# Patient Record
Sex: MALE | Race: BLACK OR AFRICAN AMERICAN | NOT HISPANIC OR LATINO | Employment: UNEMPLOYED | URBAN - METROPOLITAN AREA
[De-identification: names, ages, dates, MRNs, and addresses within clinical notes are randomized per-mention and may not be internally consistent; named-entity substitution may affect disease eponyms.]

---

## 2018-05-06 ENCOUNTER — HOSPITAL ENCOUNTER (EMERGENCY)
Facility: HOSPITAL | Age: 33
Discharge: HOME | End: 2018-05-06
Attending: EMERGENCY MEDICINE

## 2018-05-06 VITALS
DIASTOLIC BLOOD PRESSURE: 81 MMHG | WEIGHT: 220 LBS | OXYGEN SATURATION: 99 % | TEMPERATURE: 98 F | RESPIRATION RATE: 18 BRPM | SYSTOLIC BLOOD PRESSURE: 118 MMHG | HEART RATE: 69 BPM | HEIGHT: 72 IN | BODY MASS INDEX: 29.8 KG/M2

## 2018-05-06 DIAGNOSIS — S69.92XA FISH HOOK INJURY OF FINGER OF LEFT HAND, INITIAL ENCOUNTER: Primary | ICD-10-CM

## 2018-05-06 PROCEDURE — 99283 EMERGENCY DEPT VISIT LOW MDM: CPT | Mod: 25

## 2018-05-06 PROCEDURE — 25000003 PHARM REV CODE 250: Performed by: NURSE PRACTITIONER

## 2018-05-06 PROCEDURE — 10120 INC&RMVL FB SUBQ TISS SMPL: CPT

## 2018-05-06 RX ORDER — LIDOCAINE HYDROCHLORIDE 10 MG/ML
10 INJECTION INFILTRATION; PERINEURAL
Status: COMPLETED | OUTPATIENT
Start: 2018-05-06 | End: 2018-05-06

## 2018-05-06 RX ADMIN — BACITRACIN, NEOMYCIN, POLYMYXIN B 1 EACH: 400; 3.5; 5 OINTMENT TOPICAL at 01:05

## 2018-05-06 RX ADMIN — LIDOCAINE HYDROCHLORIDE 10 ML: 10 INJECTION, SOLUTION INFILTRATION; PERINEURAL at 01:05

## 2018-05-06 NOTE — ED TRIAGE NOTES
Pt. Reports being digging in box and having fish hook lodged in left index finger. No drainage at the site pt. Rates pain 8/10 in NAD

## 2018-05-06 NOTE — ED PROVIDER NOTES
"Encounter Date: 5/6/2018  SORT: This is a 33 y/o male that presents to ED with a fish hook embedded into his left index finger.  The fish hook is pronged and he cannot remove it.  His tetanus is UTD.  Orders placed. Awaiting further evaluation.     History     Chief Complaint   Patient presents with    Foreign Body     " got a fish hook caught in my left index finger"      CC: fish hook injury    HPI:  32-year-old male presents for emergent consideration of a fishhook stuck in his left index finger just prior to arrival.  He has not tried any techniques to remove it.  Reports his tetanus shot is up-to-date.  There is no current bleeding, redness, warmth.  Current severity of pain is 8/10.      The history is provided by the patient. No  was used.     Review of patient's allergies indicates:  No Known Allergies  Past Medical History:   Diagnosis Date    Eczema      No past surgical history on file.  Family History   Problem Relation Age of Onset    Diabetes Sister     No Known Problems Brother     No Known Problems Daughter     No Known Problems Son     Cancer Paternal Grandfather 76        colon    No Known Problems Son      Social History   Substance Use Topics    Smoking status: Current Every Day Smoker     Packs/day: 1.00     Types: Cigarettes    Smokeless tobacco: Current User    Alcohol use Yes     Review of Systems   Constitutional: Negative for appetite change, chills, diaphoresis, fatigue and fever.   HENT: Negative for congestion, ear discharge, ear pain, postnasal drip, rhinorrhea, sinus pressure, sneezing, sore throat and voice change.    Eyes: Negative for discharge, itching and visual disturbance.   Respiratory: Negative for cough, shortness of breath and wheezing.    Cardiovascular: Negative for chest pain, palpitations and leg swelling.   Gastrointestinal: Negative for abdominal pain, nausea and vomiting.   Endocrine: Negative for polydipsia, polyphagia and polyuria. "   Genitourinary: Negative for difficulty urinating, discharge, dysuria, frequency, hematuria, penile pain, penile swelling and urgency.   Musculoskeletal: Negative for arthralgias and myalgias.   Skin: Positive for wound. Negative for rash.   Neurological: Negative for dizziness, seizures, syncope and weakness.   Hematological: Negative for adenopathy. Does not bruise/bleed easily.   Psychiatric/Behavioral: Negative for agitation and self-injury. The patient is not nervous/anxious.        Physical Exam     Initial Vitals [05/06/18 1240]   BP Pulse Resp Temp SpO2   136/78 80 20 98.5 °F (36.9 °C) 96 %      MAP       97.33         Physical Exam    Nursing note and vitals reviewed.  Constitutional: He appears well-developed and well-nourished. He is not diaphoretic. No distress.   HENT:   Head: Normocephalic and atraumatic.   Right Ear: External ear normal.   Left Ear: External ear normal.   Nose: Nose normal.   Eyes: Pupils are equal, round, and reactive to light. Right eye exhibits no discharge. Left eye exhibits no discharge. No scleral icterus.   Neck: Normal range of motion.   Pulmonary/Chest: No respiratory distress.   Abdominal: He exhibits no distension.   Musculoskeletal: Normal range of motion.   Neurological: He is alert and oriented to person, place, and time.   Skin: Skin is dry. Capillary refill takes less than 2 seconds.   Fish hook protruding from pad of left index finger         ED Course   Foreign Body  Date/Time: 5/6/2018 10:02 PM  Performed by: SUKUMAR KHOURY  Authorized by: DARY PADRON   Consent Done: Not Needed  Body area: skin  General location: upper extremity  Location details: left index finger  Anesthesia: local infiltration    Anesthesia:  Local Anesthetic: lidocaine 2% without epinephrine and lidocaine 1% without epinephrine  Anesthetic total: 0.5 mL  Patient sedated: no  Patient restrained: no  Patient cooperative: yes  Removal mechanism: string technique.  Dressing: antibiotic  ointment and dressing applied  Depth: subcutaneous  Complexity: simple  1 objects recovered.  Objects recovered: fish hook  Post-procedure assessment: foreign body removed  Patient tolerance: Patient tolerated the procedure well with no immediate complications      Labs Reviewed - No data to display                APC / Resident Notes:   Patient is a 32-year-old male who presents to the emergency department for foreign body in his left index finger.  On examination there is a fishhook protruding from the left index finger on the palmar surface.  There is no redness, warmth.    See procedure note for removal.    Tetanus was up-to-date.  Patient was discharged home in good condition to follow up with his primary care provider as needed.    Care of the patient discussed with Dr. Kan who agreed with the assessment and plan.                   Clinical Impression:   The encounter diagnosis was Fish hook injury of finger of left hand, initial encounter.    Disposition:   Disposition: Discharged  Condition: Stable                        Tobi Arias, [facility]  05/06/18 0689

## 2020-01-01 ENCOUNTER — HOSPITAL ENCOUNTER (EMERGENCY)
Facility: HOSPITAL | Age: 35
Discharge: LEFT BEFORE DISCHARGE | End: 2020-01-01
Attending: EMERGENCY MEDICINE

## 2020-01-01 VITALS
OXYGEN SATURATION: 99 % | SYSTOLIC BLOOD PRESSURE: 136 MMHG | WEIGHT: 225 LBS | TEMPERATURE: 99 F | HEART RATE: 61 BPM | RESPIRATION RATE: 18 BRPM | DIASTOLIC BLOOD PRESSURE: 74 MMHG | HEIGHT: 72 IN | BODY MASS INDEX: 30.48 KG/M2

## 2020-01-01 DIAGNOSIS — R10.10 UPPER ABDOMINAL PAIN, UNSPECIFIED: ICD-10-CM

## 2020-01-01 DIAGNOSIS — R10.13 EPIGASTRIC ABDOMINAL PAIN: ICD-10-CM

## 2020-01-01 LAB
BASOPHILS # BLD AUTO: 0.06 K/UL (ref 0–0.2)
BASOPHILS NFR BLD: 0.6 % (ref 0–1.9)
DIFFERENTIAL METHOD: ABNORMAL
EOSINOPHIL # BLD AUTO: 0.1 K/UL (ref 0–0.5)
EOSINOPHIL NFR BLD: 0.6 % (ref 0–8)
ERYTHROCYTE [DISTWIDTH] IN BLOOD BY AUTOMATED COUNT: 14.8 % (ref 11.5–14.5)
HCT VFR BLD AUTO: 48.3 % (ref 40–54)
HGB BLD-MCNC: 16.4 G/DL (ref 14–18)
IMM GRANULOCYTES # BLD AUTO: 0.04 K/UL (ref 0–0.04)
IMM GRANULOCYTES NFR BLD AUTO: 0.4 % (ref 0–0.5)
LYMPHOCYTES # BLD AUTO: 2.6 K/UL (ref 1–4.8)
LYMPHOCYTES NFR BLD: 23.4 % (ref 18–48)
MCH RBC QN AUTO: 32.2 PG (ref 27–31)
MCHC RBC AUTO-ENTMCNC: 34 G/DL (ref 32–36)
MCV RBC AUTO: 95 FL (ref 82–98)
MONOCYTES # BLD AUTO: 1 K/UL (ref 0.3–1)
MONOCYTES NFR BLD: 9 % (ref 4–15)
NEUTROPHILS # BLD AUTO: 7.2 K/UL (ref 1.8–7.7)
NEUTROPHILS NFR BLD: 66 % (ref 38–73)
NRBC BLD-RTO: 0 /100 WBC
PLATELET # BLD AUTO: 243 K/UL (ref 150–350)
PMV BLD AUTO: 10.4 FL (ref 9.2–12.9)
RBC # BLD AUTO: 5.1 M/UL (ref 4.6–6.2)
WBC # BLD AUTO: 10.89 K/UL (ref 3.9–12.7)

## 2020-01-01 PROCEDURE — 99284 EMERGENCY DEPT VISIT MOD MDM: CPT | Mod: 25

## 2020-01-01 PROCEDURE — 83690 ASSAY OF LIPASE: CPT

## 2020-01-01 PROCEDURE — 63600175 PHARM REV CODE 636 W HCPCS: Performed by: EMERGENCY MEDICINE

## 2020-01-01 PROCEDURE — 80053 COMPREHEN METABOLIC PANEL: CPT

## 2020-01-01 PROCEDURE — 82150 ASSAY OF AMYLASE: CPT

## 2020-01-01 PROCEDURE — 36415 COLL VENOUS BLD VENIPUNCTURE: CPT

## 2020-01-01 PROCEDURE — 96361 HYDRATE IV INFUSION ADD-ON: CPT

## 2020-01-01 PROCEDURE — C9113 INJ PANTOPRAZOLE SODIUM, VIA: HCPCS | Performed by: EMERGENCY MEDICINE

## 2020-01-01 PROCEDURE — 96374 THER/PROPH/DIAG INJ IV PUSH: CPT

## 2020-01-01 PROCEDURE — 85025 COMPLETE CBC W/AUTO DIFF WBC: CPT

## 2020-01-01 PROCEDURE — 96375 TX/PRO/DX INJ NEW DRUG ADDON: CPT

## 2020-01-01 RX ORDER — PANTOPRAZOLE SODIUM 40 MG/10ML
40 INJECTION, POWDER, LYOPHILIZED, FOR SOLUTION INTRAVENOUS
Status: COMPLETED | OUTPATIENT
Start: 2020-01-01 | End: 2020-01-01

## 2020-01-01 RX ORDER — ONDANSETRON 2 MG/ML
4 INJECTION INTRAMUSCULAR; INTRAVENOUS
Status: COMPLETED | OUTPATIENT
Start: 2020-01-01 | End: 2020-01-01

## 2020-01-01 RX ORDER — KETOROLAC TROMETHAMINE 30 MG/ML
15 INJECTION, SOLUTION INTRAMUSCULAR; INTRAVENOUS
Status: COMPLETED | OUTPATIENT
Start: 2020-01-01 | End: 2020-01-01

## 2020-01-01 RX ADMIN — SODIUM CHLORIDE, SODIUM LACTATE, POTASSIUM CHLORIDE, AND CALCIUM CHLORIDE 1000 ML: .6; .31; .03; .02 INJECTION, SOLUTION INTRAVENOUS at 07:01

## 2020-01-01 RX ADMIN — ONDANSETRON 4 MG: 2 INJECTION INTRAMUSCULAR; INTRAVENOUS at 06:01

## 2020-01-01 RX ADMIN — KETOROLAC TROMETHAMINE 15 MG: 30 INJECTION, SOLUTION INTRAMUSCULAR at 06:01

## 2020-01-01 RX ADMIN — PANTOPRAZOLE SODIUM 40 MG: 40 INJECTION, POWDER, FOR SOLUTION INTRAVENOUS at 06:01

## 2020-01-02 NOTE — ED NOTES
Pt came into the ED with left upper quadrant abdominal pain and vomiting every morning for the past month with intermittent vomiting in the morning for the past 6 months. PT states that he saw a doctor who recommended gallbladder surgery but pt declined. PT left AMA. Form signed with Dr. Montiel and witnessed.

## 2020-01-02 NOTE — ED PROVIDER NOTES
Encounter Date: 1/1/2020       History     Chief Complaint   Patient presents with    Abdominal Pain     X 1 MOS,, LUQ     34-year-old male with history of eczema.  Patient presents to the emergency department with complaint of persistent mid epigastric generalized abdominal pain which he states is postprandial in nature has been persistent for approximately 1 month.  Patient states every morning he awakes with abdominal pain and vomiting. Patient states he does give relief after throwing up.  He denies headache, no blurred vision, no photophobia, no history of hematochezia, hematemesis, melena.  No previous history of peptic ulcer disease.  Patient does smoke marijuana daily.  He was counseled on possible cyclic vomiting syndrome secondary to marijuana use.  Patient otherwise without complaints. Currently states pain is abdomen is 6 out of 10.        Review of patient's allergies indicates:  No Known Allergies  Past Medical History:   Diagnosis Date    Eczema      History reviewed. No pertinent surgical history.  Family History   Problem Relation Age of Onset    Diabetes Sister     No Known Problems Brother     No Known Problems Daughter     No Known Problems Son     No Known Problems Son     Cancer Paternal Grandfather 76        colon     Social History     Tobacco Use    Smoking status: Current Every Day Smoker     Packs/day: 1.00     Types: Cigarettes    Smokeless tobacco: Current User   Substance Use Topics    Alcohol use: Yes     Comment: occassionally     Drug use: No     Review of Systems   Constitutional: Negative for fever.   HENT: Negative for congestion, rhinorrhea and sore throat.    Eyes: Negative for visual disturbance.   Respiratory: Negative for cough, chest tightness and shortness of breath.    Cardiovascular: Negative for chest pain and palpitations.   Gastrointestinal: Positive for abdominal pain, nausea and vomiting. Negative for diarrhea.   Genitourinary: Negative for difficulty  urinating, discharge, flank pain, penile pain and testicular pain.   Musculoskeletal: Negative for arthralgias and myalgias.   Skin: Negative for rash.   Neurological: Negative for dizziness, seizures and headaches.   Hematological: Negative for adenopathy. Does not bruise/bleed easily.   Psychiatric/Behavioral: Negative for behavioral problems and confusion. The patient is not nervous/anxious.        Physical Exam     Initial Vitals [01/01/20 1719]   BP Pulse Resp Temp SpO2   136/74 61 18 98.6 °F (37 °C) 99 %      MAP       --         Physical Exam    Nursing note and vitals reviewed.  Constitutional: He appears well-developed and well-nourished.   HENT:   Head: Normocephalic and atraumatic.   Nose: Nose normal.   Mouth/Throat: Oropharynx is clear and moist.   Eyes: Conjunctivae and EOM are normal. Pupils are equal, round, and reactive to light. No scleral icterus.   Neck: Normal range of motion. Neck supple.   Cardiovascular: Normal rate, regular rhythm, normal heart sounds and intact distal pulses. Exam reveals no gallop and no friction rub.    No murmur heard.  Pulmonary/Chest: Breath sounds normal. No stridor. No respiratory distress.   Abdominal: He exhibits no mass. There is no hepatosplenomegaly, splenomegaly or hepatomegaly. There is no tenderness. There is no rebound, no guarding, no tenderness at McBurney's point and negative Whitlock's sign. Hernia confirmed negative in the right inguinal area and confirmed negative in the left inguinal area.   Positive tenderness to the mid epigastric region.  No rebound or guarding noted.   Musculoskeletal: Normal range of motion. He exhibits no edema.   Lymphadenopathy:     He has no cervical adenopathy.   Neurological: He is alert and oriented to person, place, and time. He has normal strength and normal reflexes. No cranial nerve deficit or sensory deficit. GCS score is 15. GCS eye subscore is 4. GCS verbal subscore is 5. GCS motor subscore is 6.   Skin: Skin is warm  and dry. Capillary refill takes less than 2 seconds. No rash noted.   Psychiatric: He has a normal mood and affect. His behavior is normal. Judgment and thought content normal.         ED Course   Procedures  Labs Reviewed   CBC W/ AUTO DIFFERENTIAL - Abnormal; Notable for the following components:       Result Value    Mean Corpuscular Hemoglobin 32.2 (*)     RDW 14.8 (*)     All other components within normal limits   COMPREHENSIVE METABOLIC PANEL   AMYLASE   LIPASE   URINALYSIS, REFLEX TO URINE CULTURE   DRUG SCREEN PANEL, URINE EMERGENCY          Imaging Results          X-Ray Abdomen Flat And Erect (Final result)  Result time 01/01/20 17:49:18    Final result by Peña Meade MD (01/01/20 17:49:18)                 Impression:      Negative abdomen.      Electronically signed by: Peña Meade MD  Date:    01/01/2020  Time:    17:49             Narrative:    EXAMINATION:  XR ABDOMEN FLAT AND ERECT    CLINICAL HISTORY:  Upper abdominal pain, unspecified 1 month of intermittent left upper quadrant abdominal pain    FINDINGS:  Supine and upright abdomen show no pneumoperitoneum.  Bowel gas pattern is nonobstructive without intra-abdominal mass effect organomegaly.  Left pelvic phleboliths incidentally noted.  No acute osseous abnormality.                               X-Ray Chest 1 View (Final result)  Result time 01/01/20 17:48:23    Final result by Peña Meade MD (01/01/20 17:48:23)                 Impression:      Negative chest.      Electronically signed by: Peña Meade MD  Date:    01/01/2020  Time:    17:48             Narrative:    EXAMINATION:  XR CHEST 1 VIEW    CLINICAL HISTORY:  Epigastric pain intermittent left upper quadrant abdominal pain    FINDINGS:  Portable chest at 1735 compared with 11/01/2014 shows normal cardiomediastinal silhouette.    Lungs are clear. Pulmonary vasculature is normal. No acute osseous abnormality.                                 Medical Decision Making:   Initial  Assessment:   34-year-old male presents to the emergency department with complaint of mid epigastric abdominal pain and vomiting after 1 month duration.  Differential Diagnosis:   Acalculous cholecystitis, pancreatitis, cyclic vomiting syndrome secondary to marijuana use, gastritis, colitis, IBS, Crohn's disease  Clinical Tests:   Lab Tests: Ordered and Reviewed  Radiological Study: Ordered and Reviewed  Medical Tests: Ordered and Reviewed                                 Clinical Impression:       ICD-10-CM ICD-9-CM   1. Epigastric abdominal pain R10.13 789.06   2. Upper abdominal pain, unspecified R10.10 789.09         Disposition:   Disposition: AMA  Patient was reassessed and decided he wanted to leave [place].  Patient reports he had 4 children at home that he needs to attend to and could no longer wait in the emergency room.  He has been advised of all risks involving his decision and patient presents right my to sign out based on his understanding.                     Luther Montiel MD  01/01/20 [id number]